# Patient Record
Sex: MALE | Race: WHITE | NOT HISPANIC OR LATINO | Employment: UNEMPLOYED | URBAN - METROPOLITAN AREA
[De-identification: names, ages, dates, MRNs, and addresses within clinical notes are randomized per-mention and may not be internally consistent; named-entity substitution may affect disease eponyms.]

---

## 2022-01-03 ENCOUNTER — HOSPITAL ENCOUNTER (EMERGENCY)
Facility: HOSPITAL | Age: 27
Discharge: HOME | End: 2022-01-03
Attending: EMERGENCY MEDICINE | Admitting: EMERGENCY MEDICINE
Payer: COMMERCIAL

## 2022-01-03 ENCOUNTER — APPOINTMENT (EMERGENCY)
Dept: CT IMAGING | Facility: HOSPITAL | Age: 27
End: 2022-01-03
Payer: COMMERCIAL

## 2022-01-03 ENCOUNTER — APPOINTMENT (EMERGENCY)
Dept: RADIOLOGY | Facility: HOSPITAL | Age: 27
End: 2022-01-03
Payer: COMMERCIAL

## 2022-01-03 VITALS
OXYGEN SATURATION: 98 % | RESPIRATION RATE: 17 BRPM | SYSTOLIC BLOOD PRESSURE: 143 MMHG | HEART RATE: 69 BPM | DIASTOLIC BLOOD PRESSURE: 80 MMHG | WEIGHT: 149 LBS | TEMPERATURE: 98.5 F

## 2022-01-03 DIAGNOSIS — S63.014A CLOSED DISLOCATION OF DISTAL RADIOULNAR JOINT OF RIGHT WRIST, INITIAL ENCOUNTER: ICD-10-CM

## 2022-01-03 DIAGNOSIS — S52.611A FRACTURE OF RIGHT ULNAR STYLOID: Primary | ICD-10-CM

## 2022-01-03 PROCEDURE — 73100 X-RAY EXAM OF WRIST: CPT

## 2022-01-03 PROCEDURE — 99285 EMERGENCY DEPT VISIT HI MDM: CPT | Performed by: EMERGENCY MEDICINE

## 2022-01-03 PROCEDURE — 73130 X-RAY EXAM OF HAND: CPT

## 2022-01-03 PROCEDURE — 73110 X-RAY EXAM OF WRIST: CPT

## 2022-01-03 PROCEDURE — 73200 CT UPPER EXTREMITY W/O DYE: CPT

## 2022-01-03 PROCEDURE — 99284 EMERGENCY DEPT VISIT MOD MDM: CPT

## 2022-01-03 PROCEDURE — 25605 CLTX DST RDL FX/EPHYS SEP W/: CPT | Performed by: EMERGENCY MEDICINE

## 2022-01-03 PROCEDURE — 73090 X-RAY EXAM OF FOREARM: CPT

## 2022-01-03 PROCEDURE — G1004 CDSM NDSC: HCPCS

## 2022-01-03 PROCEDURE — 96374 THER/PROPH/DIAG INJ IV PUSH: CPT

## 2022-01-03 RX ORDER — LIDOCAINE HYDROCHLORIDE 10 MG/ML
10 INJECTION, SOLUTION EPIDURAL; INFILTRATION; INTRACAUDAL; PERINEURAL ONCE
Status: COMPLETED | OUTPATIENT
Start: 2022-01-03 | End: 2022-01-03

## 2022-01-03 RX ORDER — FENTANYL CITRATE 50 UG/ML
50 INJECTION, SOLUTION INTRAMUSCULAR; INTRAVENOUS ONCE
Status: COMPLETED | OUTPATIENT
Start: 2022-01-03 | End: 2022-01-03

## 2022-01-03 RX ORDER — HYDROCODONE BITARTRATE AND ACETAMINOPHEN 5; 325 MG/1; MG/1
1 TABLET ORAL EVERY 6 HOURS PRN
Qty: 4 TABLET | Refills: 0 | Status: SHIPPED | OUTPATIENT
Start: 2022-01-03 | End: 2022-01-04

## 2022-01-03 RX ORDER — PROPOFOL 10 MG/ML
100 INJECTION, EMULSION INTRAVENOUS ONCE
Status: COMPLETED | OUTPATIENT
Start: 2022-01-03 | End: 2022-01-03

## 2022-01-03 RX ORDER — HYDROCODONE BITARTRATE AND ACETAMINOPHEN 5; 325 MG/1; MG/1
1 TABLET ORAL EVERY 6 HOURS PRN
Qty: 4 TABLET | Refills: 0 | Status: SHIPPED | OUTPATIENT
Start: 2022-01-03 | End: 2022-01-03 | Stop reason: SDUPTHER

## 2022-01-03 RX ORDER — KETAMINE HCL IN NACL, ISO-OSM 100MG/10ML
0.5 SYRINGE (ML) INJECTION ONCE
Status: COMPLETED | OUTPATIENT
Start: 2022-01-03 | End: 2022-01-03

## 2022-01-03 RX ADMIN — FENTANYL CITRATE 50 MCG: 50 INJECTION INTRAMUSCULAR; INTRAVENOUS at 18:43

## 2022-01-03 RX ADMIN — PROPOFOL 100 MG: 10 INJECTION, EMULSION INTRAVENOUS at 19:45

## 2022-01-03 RX ADMIN — Medication 34 MG: at 19:45

## 2022-01-03 RX ADMIN — LIDOCAINE HYDROCHLORIDE 10 ML: 10 INJECTION, SOLUTION EPIDURAL; INFILTRATION; INTRACAUDAL; PERINEURAL at 19:24

## 2022-01-03 NOTE — ED PROVIDER NOTES
History  Chief Complaint   Patient presents with    Wrist Injury     pt fell while snowboarding - landed on his right wrist, deformity noted to same     HPI      This is a very pleasant, nontoxic, 70-year-old male presents the emergency department status post snowboarding accident where he fell on an outstretched hand while snowboarding today at [facility]  This occurred at approximately 5:00 p m  Souleymane Alejandro Last time patient had anything to eat was at 1:00 p m  Souleymane Alejandro Patient did not hit his head  Patient is left-hand dominant  Patient went to the  and was splint by the  and advised to go to the emergency department for further evaluation  Patient is not from the area and lives in [facility]  Patient denies any chest pain, shortness of breath, nausea, vomiting, did not take any other medication for his pain prior to arrival but was icing his wrist and was in a volar splint prior to arrival   Patient denies any numbness or tingling in the R extremity, denies any shoulder pain, or shoulder pain  None       History reviewed  No pertinent past medical history  History reviewed  No pertinent surgical history  History reviewed  No pertinent family history  I have reviewed and agree with the history as documented  E-Cigarette/Vaping     E-Cigarette/Vaping Substances     Social History     Tobacco Use    Smoking status: Never Smoker    Smokeless tobacco: Never Used   Substance Use Topics    Alcohol use: Yes     Comment: rarely    Drug use: Not on file       Review of Systems   Constitutional: Negative  HENT: Negative  Eyes: Negative  Respiratory: Negative  Negative for cough  Cardiovascular: Negative  Negative for chest pain  Gastrointestinal: Negative  Negative for abdominal pain  Endocrine: Negative  Genitourinary: Negative  Musculoskeletal: Positive for joint swelling  Negative for arthralgias  Skin: Negative for wound  Allergic/Immunologic: Negative  Neurological: Negative  Hematological: Negative  Psychiatric/Behavioral: Negative  Physical Exam  Physical Exam  Vitals and nursing note reviewed  Constitutional:       Appearance: Normal appearance  He is normal weight  HENT:      Head: Normocephalic and atraumatic  Right Ear: Tympanic membrane, ear canal and external ear normal       Left Ear: Tympanic membrane, ear canal and external ear normal       Nose: Nose normal       Mouth/Throat:      Mouth: Mucous membranes are moist       Pharynx: Oropharynx is clear  Eyes:      Extraocular Movements: Extraocular movements intact  Conjunctiva/sclera: Conjunctivae normal       Pupils: Pupils are equal, round, and reactive to light  Cardiovascular:      Rate and Rhythm: Normal rate and regular rhythm  Pulses: Normal pulses  Heart sounds: Normal heart sounds  Pulmonary:      Effort: Pulmonary effort is normal       Breath sounds: Normal breath sounds  Abdominal:      General: Abdomen is flat  Bowel sounds are normal    Musculoskeletal:         General: Swelling, tenderness, deformity and signs of injury present  Hands:       Cervical back: Normal range of motion  Skin:     General: Skin is warm  Capillary Refill: Capillary refill takes less than 2 seconds  Neurological:      General: No focal deficit present  Mental Status: He is alert and oriented to person, place, and time  Mental status is at baseline  Psychiatric:         Mood and Affect: Mood normal          Behavior: Behavior normal          Thought Content:  Thought content normal          Judgment: Judgment normal          Vital Signs  ED Triage Vitals [01/03/22 1723]   Temperature Pulse Respirations Blood Pressure SpO2   98 5 °F (36 9 °C) 83 14 125/69 98 %      Temp Source Heart Rate Source Patient Position - Orthostatic VS BP Location FiO2 (%)   Oral Monitor Sitting Left arm --      Pain Score       8           Vitals:    01/03/22 2018 01/03/22 2020 01/03/22 2045 01/03/22 2115   BP:  136/73 137/76 143/80   Pulse: 80  75 69   Patient Position - Orthostatic VS:             Visual Acuity       ED Medications  Medications   fentanyl citrate (PF) 100 MCG/2ML 50 mcg (50 mcg Intravenous Given 1/3/22 1843)   lidocaine (PF) (XYLOCAINE-MPF) 1 % injection 10 mL (10 mL Infiltration Given by Other 1/3/22 1924)   Ketamine HCl 34 mg (34 mg Intravenous Given 1/3/22 1945)   propofol (DIPRIVAN) 200 MG/20ML bolus injection 100 mg (100 mg Intravenous Given 1/3/22 1945)       Diagnostic Studies  Results Reviewed       None                   CT upper extremity wo contrast right   Final Result by Rogelio Isidro MD (01/03 2227)      Comminuted fracture involving the radial styloid as well as the anterior and posterior aspects of the distal radius  [place] Po Ulnar styloid fracture  Workstation performed: [id number]         XR wrist 2 vw right   ED Interpretation by Sabrina Gracia III, DO (01/03 2106)      XR wrist 2 vw right   ED Interpretation by Sabrina Gracia III, DO (01/03 2040)      XR forearm 2 views RIGHT   ED Interpretation by Sabrina Gracia III, DO (01/03 1803)   No fracture of forearm, radius and ulna intact, see wrist report  Final Result by Michael An MD (01/03 1831)   Radial styloid and ulnar styloid fractures with posterior carpal dislocation  Workstation performed: [id number]         XR wrist 3+ views RIGHT   ED Interpretation by Sabrina Gracia III, DO (01/03 1843)   Radiocarpal fracture dislocation with ulnar styloid fracture  The study was marked in [facility] for immediate notification  Workstation performed: [id number]         Final Result by Michael An MD (01/03 1832)   Radiocarpal fracture dislocation with ulnar styloid fracture  The study was marked in [facility] for immediate notification        Workstation performed: [id number]         XR hand 3+ views RIGHT   Final Result by Michael An MD (72/83 1832) Radiocarpal fracture dislocation as above  Ulnar styloid fracture also noted  Workstation performed: [id number]                    Procedures  Orthopedic injury treatment    Date/Time: 1/3/2022 7:17 PM  Performed by: Nirmala Moyer DO  Authorized by: Nirmala Moyer DO     Patient Location:  ED  Other Assisting Provider: No    Verbal consent obtained?: Yes    Risks and benefits: Risks, benefits and alternatives were discussed    Consent given by:  Patient  Patient identity confirmed:  Verbally with patient  Time out: Immediately prior to the procedure a time out was called    Injury location:  Wrist  Location details:  Left wrist  Injury type:  Fracture-dislocation  Neurovascular status: Neurovascularly intact    Distal perfusion: normal    Neurological function: normal    Range of motion: normal    Local anesthesia used?: Yes    Anesthesia:  Local infiltration and hematoma block  Sedation type:   Anxiolysis and moderate (conscious) sedation (See separate Procedural Sedation form)  Manipulation performed?: Yes    Skin traction used?: Yes    Skeletal traction used?: Yes    Reduction successful?: No    Immobilization:  Splint  Splint type:  Sugar tong  Supplies used:  Cotton padding, elastic bandage and Ortho-Glass  Neurovascular status: Neurovascularly intact    Distal perfusion: normal    Neurological function: normal    Range of motion: normal    Patient tolerance:  Patient tolerated the procedure well with no immediate complications  Orthopedic injury treatment    Date/Time: 1/3/2022 8:40 PM  Performed by: Nirmala Moyer DO  Authorized by: Nirmala Moyer DO     Patient Location:  ED  Other Assisting Provider: Yes (comment)    Patient identity confirmed:  Verbally with patient, arm band and provided demographic data  Injury location:  Wrist  Location details:  Left wrist  Injury type:  Fracture-dislocation  Neurovascular status: Neurovascularly intact Distal perfusion: normal    Neurological function: normal    Range of motion: normal    Local anesthesia used?: Yes    General anesthesia used?: Yes    Anesthesia:  Hematoma block  Manipulation performed?: Yes    Skin traction used?: Yes    Skeletal traction used?: Yes    Reduction successful?: Yes    Confirmation: Reduction confirmed by x-ray (Case reviewed via tiger text with Dr Dixon Zhoa  )    Immobilization:  Splint, ace wrap and sling  Splint type:  Sugar tong  Neurovascular status: Neurovascularly intact    Distal perfusion: normal    Neurological function: normal    Range of motion: normal    Patient tolerance:  Patient tolerated the procedure well with no immediate complications  Procedural Sedation    Date/Time: 1/3/2022 7:45 PM  Performed by: Friddie Closs, DO  Authorized by: Tej Suarez III, DO     Immediate pre-procedure details:     Reassessment: Patient reassessed immediately prior to procedure    Procedure details (see MAR for exact dosages):     Sedation start time:  1/3/2022 7:45 PM    Preoxygenation:  Nasal cannula    Analgesia:  Fentanyl    Intra-procedure monitoring:  Blood pressure monitoring, cardiac monitor, continuous capnometry, continuous pulse oximetry and frequent LOC assessments    Intra-procedure events: none      Sedation end time:  1/3/2022 8:00 PM    Total sedation time (minutes):  15  Post-procedure details:     Post-sedation assessment completed:  1/3/2022 9:33 PM    Attendance: Constant attendance by certified staff until patient recovered      Recovery: Patient returned to pre-procedure baseline      Post-sedation assessments completed and reviewed: airway patency, cardiovascular function, hydration status, mental status, nausea/vomiting, pain level, respiratory function and temperature      Patient is stable for discharge or admission: yes      Patient tolerance:   Tolerated well, no immediate complications      Conscious Sedation Assessment Classification Score   ASA Scale Assessment 1-Healthy patient, no disease outside surgical process filed at 01/03/2022 1942               ED Course  ED Course as of 01/08/22 1154   Mon Jan 03, 2022 1824 History and physical completed  X-rays reviewed, images sent to Dr Renetta Cosme  [facility] with Dr Renetta Cosme, orthopedics on call, noted since it is in the wrist and hand he does not perform procedures in this area  1917 Hematoma block performed  1928 Patient placed in finger traps  2007 Reduction completed  2018 Case discussed with hand surgery on call Dr Marisol Briseno, reviewed imaging via tiger text, past if the reduction be tried again and for more optimal realignment  2040 Reduction attempted again after discussion with ortho   2052 Review of the x-rays noted with the 2nd post reduction film better alignment  2107 Carter d/w Dr Boudreaux, noted second reduction is improved alignment  MDM  Number of Diagnoses or Management Options  Closed dislocation of distal radioulnar joint of right wrist, initial encounter  Fracture of right ulnar styloid  Diagnosis management comments:  [address] [place] [address] Rd mechanism of right hand, patient is a left-hand dominant 78-year-old male presents the emergency department with left wrist radial ulnar dislocation with the distal ulnar styloid fracture, 2 attempts at reduction occurred, see reduction note for specifics and discussion with on-call radiology and hand surgery  STAT ambulatory referral made to hand surgery, pt lives outside the area: [facility], but is willing to drive for follow up, CT imaging of wrist obtained prior to discharge  Review of the patient'demographics in the [place] department of Health for compartment program noted no red flags  Portions of the record may have been created with voice recognition software   Occasional wrong word or "sound a like" substitutions may have occurred due to the inherent limitations of voice recognition software  Read the chart carefully and recognize, using context, where substitutions have occurred  Counseling: I had a detailed discussion with the patient and/or guardian regarding: the historical points, exam findings, and any diagnostic results supporting the discharge diagnosis, lab results, radiology results, discharge instructions reviewed with patient and/or family/caregiver and understanding was verbalized  Instructions given to return to the emergency department if symptoms worsen or persist, or if there are any questions or concerns that arise at home  **PLEASE NOTE THAT A ERROR HAS OCCURRED IN DOCUMENTATION: A RIGHT WRIST REDUCTION OCCURRED X 2, not a L wrist wrist reduction  I attempted to modify my note but Epic would allow changes to my note  **  (01/08/22: 11:53 am)       Amount and/or Complexity of Data Reviewed  Clinical lab tests: ordered and reviewed  Tests in the radiology section of CPT®: ordered and reviewed  Tests in the medicine section of CPT®: ordered and reviewed        Disposition  Final diagnoses:   Fracture of right ulnar styloid   Closed dislocation of distal radioulnar joint of right wrist, initial encounter     Time reflects when diagnosis was documented in both MDM as applicable and the Disposition within this note       Time User Action Codes Description Comment    1/3/2022  9:12 PM Zhanna Kyle, [id number] [address] [R19 530C] Closed dislocation of left wrist     1/3/2022  9:12 PM Moise Frausto Add [T55 328P] Fracture of right ulnar styloid     1/3/2022  9:13 PM Anirudh 68 [S85 698T] Fracture of right ulnar styloid     1/3/2022  9:13 PM Moise Frausto Remove [S63 005A] Closed dislocation of left wrist     1/3/2022  9:16 PM Moise Frausto Add [S63 014A] Closed dislocation of distal radioulnar joint of right wrist, initial encounter           ED Disposition       ED Disposition Condition Date/Time Comment    Discharge Stable Mon Andrew 3, 2022  9:12 PM Israel Aguirre discharge to home/self care                  Follow-up Information       Follow up With Specialties Details Why [facility],  Family Medicine   [address]  [address]      Serena Robb MD Orthopedic Surgery   [address] [id number] [phone]               Patient's Medications   Discharge Prescriptions    HYDROCODONE-ACETAMINOPHEN (NORCO) 5-325 MG PER TABLET    Take 1 tablet by mouth every 6 (six) hours as needed for pain for up to 1 day Max Daily Amount: 4 tablets       Start Date: 1/3/2022  End Date: 1/4/2022       Order Dose: 1 tablet       Quantity: 4 tablet    Refills: 0           PDMP Review         Value Time User    PDMP Reviewed  Yes 1/3/2022  9:37 PM Margie Rees DO            ED Provider  Electronically Signed by           Ramsey Ahumada III, DO  01/03/22 [place] Adrienne PRAKASH,   01/08/22 [id number] [address] OLINDA, DO  01/08/22 1154

## 2022-01-04 VITALS
HEIGHT: 68 IN | WEIGHT: 145 LBS | BODY MASS INDEX: 21.98 KG/M2 | SYSTOLIC BLOOD PRESSURE: 119 MMHG | DIASTOLIC BLOOD PRESSURE: 70 MMHG | HEART RATE: 73 BPM

## 2022-01-04 DIAGNOSIS — S52.611A FRACTURE OF RIGHT ULNAR STYLOID: ICD-10-CM

## 2022-01-04 DIAGNOSIS — S63.014A CLOSED DISLOCATION OF DISTAL RADIOULNAR JOINT OF RIGHT WRIST, INITIAL ENCOUNTER: ICD-10-CM

## 2022-01-04 DIAGNOSIS — S62.121K: Primary | ICD-10-CM

## 2022-01-04 PROCEDURE — 99202 OFFICE O/P NEW SF 15 MIN: CPT | Performed by: ORTHOPAEDIC SURGERY

## 2022-01-04 RX ORDER — IBUPROFEN 200 MG
TABLET ORAL EVERY 6 HOURS PRN
COMMUNITY

## 2022-01-04 NOTE — TELEPHONE ENCOUNTER
Spoke to mom and patient and information above provided  They will call back if above remedy does not help

## 2022-01-04 NOTE — TELEPHONE ENCOUNTER
Patient is concerned that the splint will cause nerve damage as he feels his fingers are being pressed into splint  Instructed patient to check cap refill and his cap refill is within normal limits  His fingers are numb most likely from swelling, advised ice 20 on 20 off with elevation above the heart  Call with worsening of symptoms or report to the ER  Patient is not being seen until 1/10 by Dr Aracelis Houser     Please advise

## 2022-01-04 NOTE — TELEPHONE ENCOUNTER
Patient saw Dr Matt Miguel  Patients mother is calling concerned because the splint may be too tight and his fingers are tingling        Transferred to triage nurse

## 2022-01-04 NOTE — TELEPHONE ENCOUNTER
Left message to inform to arrive for 1:45am for a 2pm appt with Dr aDrrian Powers at the [facility] location on 1/10/21

## 2022-01-04 NOTE — PROGRESS NOTES
Assessment/Plan:    No problem-specific Assessment & Plan notes found for this encounter  Diagnoses and all orders for this visit:    Closed dorsal perilunate fracture dislocation of right wrist with nonunion    Other orders  -     ibuprofen (MOTRIN) 200 mg tablet; Take by mouth every 6 (six) hours as needed for mild pain AS NEEDED          Subjective:      Patient ID: Kendy Orr is a 23 y o  male  This is a 25-year-old fellow who was snowboarding and crashed into the [place] sustaining a fracture of his right wrist   He was brought to the hospital with a did a closed reduction and was able to reduce his dislocation  He now follows up  The following portions of the patient's history were reviewed and updated as appropriate: allergies, current medications, past family history, past medical history, past social history, past surgical history and problem list     Review of Systems   Constitutional: Negative  Negative for activity change, diaphoresis and fatigue  Cardiovascular: Negative for leg swelling  Musculoskeletal: Negative for arthralgias, gait problem and joint swelling  Skin: Negative for color change and wound  Neurological: Negative for numbness  Objective:      /70   Pulse 73   Ht 5' 8" (1 727 m)   Wt 65 8 kg (145 lb)   BMI 22 05 kg/m²          Physical Exam      On physical examination he is currently in the splint  He has good sensation and capillary refill distally  His alignment looks good  There is no sign of subluxation or dislocation

## 2022-01-04 NOTE — ASSESSMENT & PLAN NOTE
I am going to do S this case with Dr Tyrese Graves  I think he will probably need an open reduction internal fixation to stabilize is very complex injury  He has already had a CT scan on the chart  We will have him have a default appointment at the [facility] on Monday if he has not contacted before then

## 2022-01-04 NOTE — TELEPHONE ENCOUNTER
Hello,    Please advise if the following patient can be forced onto the scheduled:    Andres Plummer   [birth year]  MRN [id number]  # [phone]  Requesting Dr Michaelle Mckinley  Right wrist fracture, DOI 1/3, was advised by Ed to be seen today    Email sent to [facility]

## 2022-01-04 NOTE — TELEPHONE ENCOUNTER
I discussed the case with Dr Lara Bello  In addition to icing the arm, the patient may loosen the ace wraps around the splint  If he continues to have symptoms after these measures, he can call again  Would you like me to call the patient and inform him? Thanks very much

## 2022-01-10 ENCOUNTER — APPOINTMENT (OUTPATIENT)
Dept: RADIOLOGY | Facility: CLINIC | Age: 27
End: 2022-01-10
Payer: COMMERCIAL

## 2022-01-10 VITALS
WEIGHT: 145 LBS | DIASTOLIC BLOOD PRESSURE: 80 MMHG | BODY MASS INDEX: 21.98 KG/M2 | SYSTOLIC BLOOD PRESSURE: 123 MMHG | HEIGHT: 68 IN

## 2022-01-10 DIAGNOSIS — S63.014A CLOSED DISLOCATION OF DISTAL RADIOULNAR JOINT OF RIGHT WRIST, INITIAL ENCOUNTER: ICD-10-CM

## 2022-01-10 DIAGNOSIS — S63.094A CLOSED DISLOCATION OF PERILUNATE JOINT OF RIGHT WRIST, INITIAL ENCOUNTER: Primary | ICD-10-CM

## 2022-01-10 PROCEDURE — 29075 APPL CST ELBW FNGR SHORT ARM: CPT | Performed by: ORTHOPAEDIC SURGERY

## 2022-01-10 PROCEDURE — 99214 OFFICE O/P EST MOD 30 MIN: CPT | Performed by: ORTHOPAEDIC SURGERY

## 2022-01-10 PROCEDURE — 73110 X-RAY EXAM OF WRIST: CPT

## 2022-01-10 NOTE — PROGRESS NOTES
ASSESSMENT/PLAN:    Assessment:   Right wrist radial styloid fracture/dislocation    Plan:   Conservative versus operative treatments were discussed with the patient and his mother today at length  Due to maintained alignment after his reduction on x-ray today  It was discussed with the patient and his mother that it would be reasonable to place the patient into a short-arm cast and continue to monitor his fracture with weekly x-rays  It was discussed that if there is any movement across the wrist over the next week or two that surgical intervention consisting of open reduction internal fixation right wrist dislocation (suture anchors, tri-Manpacks wrist fx set) would be advised  Patient was also given the option of surgical intervention this week  Patient would like to do a trial of conservative treatment options at this time  We will see him back next week for repeat x-rays  It was discussed with the patient his mother today that this wrist will never be 100% normal due to his significant injury sustained  Follow Up:  1  week(s)    To Do Next Visit:    and X-rays of the  right  wrist    General Discussions:     Fracture - Nonoperative Care: The physiology of a fractured bone was discussed with the patient today  With nondisplaced or minimally displaced fractures, conservative treatment often results in a functional recovery  Typically, these fractures are immobilized in either a cast or splint depending on the pattern  Radiographs are typically taken at intervals throughout the fracture healing to ensure that muscular forces do not cause loss of reduction or alignment  If the fracture loses its alignment, surgical intervention may be required to stabilize it  Medical conditions such as diabetes, osteoporosis, vitamin D deficiency, and a history of or exposure to smoking may delay or prevent fracture healing      Operative Discussions:       _____________________________________________________  CHIEF COMPLAINT:  Chief Complaint   Patient presents with    Right Wrist - Fracture     XR 1/3/2022         SUBJECTIVE:  Shayy Myers is a 23 y o  male who presents with Fracture to the right wrist   This started  1 week(s) ago as Due to a personal injury  Patient states that he fell when he was snowboarding  Radiation: Yes to the  wrist  Previous Treatments: reduction and splinting with only partial relief  Associated symptoms: No Complaints  Handedness: left  Work status: not working     PAST MEDICAL HISTORY:  History reviewed  No pertinent past medical history  PAST SURGICAL HISTORY:  History reviewed  No pertinent surgical history  FAMILY HISTORY:  History reviewed  No pertinent family history  SOCIAL HISTORY:  Social History     Tobacco Use    Smoking status: Never Smoker    Smokeless tobacco: Never Used   Substance Use Topics    Alcohol use: Yes     Comment: rarely    Drug use: Not on file       MEDICATIONS:    Current Outpatient Medications:     ibuprofen (MOTRIN) 200 mg tablet, Take by mouth every 6 (six) hours as needed for mild pain AS NEEDED, Disp: , Rfl:     ALLERGIES:  No Known Allergies    REVIEW OF SYSTEMS:  Pertinent items are noted in HPI      LABS:  HgA1c: No results found for: HGBA1C  BMP: No results found for: GLUCOSE, CALCIUM, NA, K, CO2, CL, BUN, CREATININE      _____________________________________________________  PHYSICAL EXAMINATION:  Vital signs: /80   Ht 5' 8" (1 727 m)   Wt 65 8 kg (145 lb)   BMI 22 05 kg/m²   General: well developed and well nourished, alert, oriented times 3 and appears comfortable  Psychiatric: Normal  HEENT: Trachea Midline, No torticollis  Cardiovascular: No discernable arrhythmia  Pulmonary: No wheezing or stridor  Abdomen: No rebound or guarding  Extremities: No peripheral edema  Skin: No masses, erythema, lacerations, fluctation, ulcerations  Neurovascular: Sensation Intact to the Median, Ulnar, Radial Nerve, Motor Intact to the Median, Ulnar, Radial Nerve and Pulses Intact    MUSCULOSKELETAL EXAMINATION:  RIGHT SIDE:  Wrist:  Patient presents in a well fitted sugar-tong splint today  Finger sensation is intact  Neurovascularly intact    _____________________________________________________  STUDIES REVIEWED:  Images were reviewed in PACS by Dr Jenaro Quesada and demonstrate:  X-ray of right wrist on 01/10/2022 demonstrates maintained alignment status post wrist fracture dislocation        PROCEDURES PERFORMED:  Fracture / Dislocation Treatment    Date/Time: 1/10/2022 3:10 PM  Performed by: Trang Louie MD  Authorized by: Trang Louie MD     Patient Location:  Clinic  Verbal consent obtained?: Yes    Risks and benefits: Risks, benefits and alternatives were discussed    Consent given by:  Patient  Site marked: Yes    Injury location:  Wrist  Location details:  Right wrist  Injury type:  Fracture-dislocation  Fracture-dislocation type: Stewart's    Fracture-dislocation type: Stewart's    Neurovascular status: Neurovascularly intact    Manipulation performed?: No    Immobilization:  Cast  Cast type:  Short arm  Supplies used:  Cotton padding and fiberglass  Neurovascular status: Neurovascularly intact    Patient tolerance:  Patient tolerated the procedure well with no immediate complications          Scribe Attestation    I,:  Delmar Galaviz am acting as a scribe while in the presence of the attending physician :       I,:  Trang Louie MD personally performed the services described in this documentation    as scribed in my presence :

## 2022-01-19 ENCOUNTER — HOSPITAL ENCOUNTER (OUTPATIENT)
Dept: RADIOLOGY | Facility: HOSPITAL | Age: 27
Discharge: HOME | End: 2022-01-19
Attending: ORTHOPAEDIC SURGERY
Payer: COMMERCIAL

## 2022-01-19 VITALS
DIASTOLIC BLOOD PRESSURE: 73 MMHG | HEIGHT: 68 IN | WEIGHT: 145 LBS | BODY MASS INDEX: 21.98 KG/M2 | HEART RATE: 81 BPM | SYSTOLIC BLOOD PRESSURE: 118 MMHG

## 2022-01-19 DIAGNOSIS — S63.094A CLOSED DISLOCATION OF PERILUNATE JOINT OF RIGHT WRIST, INITIAL ENCOUNTER: ICD-10-CM

## 2022-01-19 DIAGNOSIS — S52.514D CLOSED NONDISPLACED FRACTURE OF STYLOID PROCESS OF RIGHT RADIUS WITH ROUTINE HEALING, SUBSEQUENT ENCOUNTER: Primary | ICD-10-CM

## 2022-01-19 PROCEDURE — 99213 OFFICE O/P EST LOW 20 MIN: CPT | Performed by: ORTHOPAEDIC SURGERY

## 2022-01-19 PROCEDURE — 73110 X-RAY EXAM OF WRIST: CPT

## 2022-01-19 NOTE — PROGRESS NOTES
ASSESSMENT/PLAN:    Assessment:   Fracture right radial styloid - stable    Plan:   Continue NWB to the right wrist in cast  - will continue to treat non-operatively as fracture is currently stable  - will repeat imaging in 1 week and will likely complete 6 total weeks in a cast    Follow Up:  1  week(s)    To Do Next Visit:  X-rays of the  right  wrist    General Discussions:  Fracture - Nonoperative Care: The physiology of a fractured bone was discussed with the patient today  With nondisplaced or minimally displaced fractures, conservative treatment often results in a functional recovery  Typically, these fractures are immobilized in either a cast or splint depending on the pattern  Radiographs are typically taken at intervals throughout the fracture healing to ensure that muscular forces do not cause loss of reduction or alignment  If the fracture loses its alignment, surgical intervention may be required to stabilize it  Medical conditions such as diabetes, osteoporosis, vitamin D deficiency, and a history of or exposure to smoking may delay or prevent fracture healing     _____________________________________________________  CHIEF COMPLAINT:  Chief Complaint   Patient presents with    Right Wrist - Fracture, Follow-up         SUBJECTIVE:  Shayy Myers is a 23 y o  male who presents for follow up regarding right radial styloid fracture from an injury sustained on 1/3/2022  Patient has been treated non-operatively in a cast  He denies any issues today, no increase in pain  PAST MEDICAL HISTORY:  History reviewed  No pertinent past medical history  PAST SURGICAL HISTORY:  History reviewed  No pertinent surgical history  FAMILY HISTORY:  History reviewed  No pertinent family history      SOCIAL HISTORY:  Social History     Tobacco Use    Smoking status: Never Smoker    Smokeless tobacco: Never Used   Substance Use Topics    Alcohol use: Yes     Comment: rarely    Drug use: Not on file MEDICATIONS:    Current Outpatient Medications:     ibuprofen (MOTRIN) 200 mg tablet, Take by mouth every 6 (six) hours as needed for mild pain AS NEEDED, Disp: , Rfl:     ALLERGIES:  No Known Allergies    REVIEW OF SYSTEMS:  Pertinent items are noted in HPI  A comprehensive review of systems was negative      LABS:  HgA1c: No results found for: HGBA1C  BMP: No results found for: GLUCOSE, CALCIUM, NA, K, CO2, CL, BUN, CREATININE        _____________________________________________________  PHYSICAL EXAMINATION:  Vital signs: /73   Pulse 81   Ht 5' 8" (1 727 m)   Wt 65 8 kg (145 lb)   BMI 22 05 kg/m²   General: well developed and well nourished, alert, oriented times 3 and appears comfortable  Psychiatric: Normal  HEENT: Trachea Midline, No torticollis  Cardiovascular: No discernable arrhythmia  Pulmonary: No wheezing or stridor  Abdomen: No rebound or guarding  Extremities: No peripheral edema  Skin: No masses, erythema, lacerations, fluctation, ulcerations  Neurovascular: Sensation Intact to the Median, Ulnar, Radial Nerve, Motor Intact to the Median, Ulnar, Radial Nerve and Pulses Intact    MUSCULOSKELETAL EXAMINATION:  Right Wrist - examined in cast  Able to make a full fist  Sensation intact  Brisk capillary refill    _____________________________________________________  STUDIES REVIEWED:  Images were reviewed in PACS by Dr Huang Ibarra and demonstrate: stable alignment of radial styloid fractue      PROCEDURES PERFORMED:  Procedures  No Procedures performed today    Scribe Attestation    I,:  Huma Hunter PA-C am acting as a scribe while in the presence of the attending physician :       I,:  Leopold Mitts, MD personally performed the services described in this documentation    as scribed in my presence :           Scribe Attestation    I,:  Huma Hunter PA-C am acting as a scribe while in the presence of the attending physician :       I,:  Leopold Mitts, MD personally performed the services described in this documentation    as scribed in my presence :

## 2022-01-27 NOTE — PROGRESS NOTES
ASSESSMENT/PLAN:    Assessment:   Fracture right radial styloid - stable, currently being treated non-opeatively    Plan:   Continue non-operative management in a cast for an additional 3 weeks    Follow Up:  3  week(s)    To Do Next Visit:  X-rays of the  right  wrist and Cast/splint off prior to x-ray      _____________________________________________________  CHIEF COMPLAINT:  No chief complaint on file  SUBJECTIVE:  Andres Plummer is a 23 y o  male who presents for follow up regarding right wrist fracture from an injury sustained on 1/3/2022  Patient is currently being treated non-operatively in a cast  He denies any numbness or tingling  He denies any significant pain  He denies any other acute complaints  Handedness: left  Work status: not currently working    PAST MEDICAL HISTORY:  No past medical history on file  PAST SURGICAL HISTORY:  No past surgical history on file  FAMILY HISTORY:  No family history on file  SOCIAL HISTORY:  Social History     Tobacco Use    Smoking status: Never Smoker    Smokeless tobacco: Never Used   Substance Use Topics    Alcohol use: Yes     Comment: rarely    Drug use: Not on file       MEDICATIONS:    Current Outpatient Medications:     ibuprofen (MOTRIN) 200 mg tablet, Take by mouth every 6 (six) hours as needed for mild pain AS NEEDED, Disp: , Rfl:     ALLERGIES:  No Known Allergies    REVIEW OF SYSTEMS:  Pertinent items are noted in HPI  A comprehensive review of systems was negative  LABS:  HgA1c: No results found for: HGBA1C  BMP: No results found for: GLUCOSE, CALCIUM, NA, K, CO2, CL, BUN, CREATININE        _____________________________________________________  PHYSICAL EXAMINATION:  Vital signs: There were no vitals taken for this visit    General: well developed and well nourished, alert, oriented times 3 and appears comfortable  Psychiatric: Normal  HEENT: Trachea Midline, No torticollis  Cardiovascular: No discernable arrhythmia  Pulmonary: No wheezing or stridor  Abdomen: No rebound or guarding  Extremities: No peripheral edema  Skin: No masses, erythema, lacerations, fluctation, ulcerations  Neurovascular: Sensation Intact to the Median, Ulnar, Radial Nerve, Motor Intact to the Median, Ulnar, Radial Nerve and Pulses Intact    MUSCULOSKELETAL EXAMINATION:  Right Wrist in cast   Able to move all fingers   Sensation intact to fingers  Brisk capillary refill    _____________________________________________________  STUDIES REVIEWED:  Images were reviewed in PACS by Dr Kai Canas and demonstrate: stable appearance of radial styloid fracture without shift         PROCEDURES PERFORMED:  Procedures  No Procedures performed today

## 2022-01-28 ENCOUNTER — HOSPITAL ENCOUNTER (OUTPATIENT)
Dept: RADIOLOGY | Facility: HOSPITAL | Age: 27
Discharge: HOME | End: 2022-01-28
Payer: COMMERCIAL

## 2022-01-28 VITALS — HEIGHT: 68 IN | BODY MASS INDEX: 21.98 KG/M2 | WEIGHT: 145 LBS

## 2022-01-28 DIAGNOSIS — S52.514D CLOSED NONDISPLACED FRACTURE OF STYLOID PROCESS OF RIGHT RADIUS WITH ROUTINE HEALING, SUBSEQUENT ENCOUNTER: ICD-10-CM

## 2022-01-28 DIAGNOSIS — S52.514D CLOSED NONDISPLACED FRACTURE OF STYLOID PROCESS OF RIGHT RADIUS WITH ROUTINE HEALING, SUBSEQUENT ENCOUNTER: Primary | ICD-10-CM

## 2022-01-28 PROCEDURE — 99212 OFFICE O/P EST SF 10 MIN: CPT | Performed by: PHYSICIAN ASSISTANT

## 2022-01-28 PROCEDURE — 73110 X-RAY EXAM OF WRIST: CPT

## 2022-02-14 ENCOUNTER — APPOINTMENT (OUTPATIENT)
Dept: RADIOLOGY | Facility: CLINIC | Age: 27
End: 2022-02-14
Payer: COMMERCIAL

## 2022-02-14 VITALS — WEIGHT: 144 LBS | HEIGHT: 68 IN | BODY MASS INDEX: 21.82 KG/M2

## 2022-02-14 DIAGNOSIS — S52.514D CLOSED NONDISPLACED FRACTURE OF STYLOID PROCESS OF RIGHT RADIUS WITH ROUTINE HEALING, SUBSEQUENT ENCOUNTER: Primary | ICD-10-CM

## 2022-02-14 DIAGNOSIS — S52.514D CLOSED NONDISPLACED FRACTURE OF STYLOID PROCESS OF RIGHT RADIUS WITH ROUTINE HEALING, SUBSEQUENT ENCOUNTER: ICD-10-CM

## 2022-02-14 PROCEDURE — 99213 OFFICE O/P EST LOW 20 MIN: CPT | Performed by: ORTHOPAEDIC SURGERY

## 2022-02-14 PROCEDURE — 73110 X-RAY EXAM OF WRIST: CPT

## 2022-02-14 NOTE — PROGRESS NOTES
ASSESSMENT/PLAN:    Assessment:   Fracture right radial styloid - healed    Plan:   Patient can use brace for lifting activities at this time  Patient can work on range of motion activities with progression to strengthening  Follow Up:  6  week(s)    To Do Next Visit:  X-rays of the  right  wrist      _____________________________________________________  CHIEF COMPLAINT:  Chief Complaint   Patient presents with    Right Wrist - Follow-up, Fracture         SUBJECTIVE:  Kendy Orr is a 33 y o  male who presents for follow up regarding right radial styloid fracture treated non operatively in a cast   Patient has been treating this for 6 weeks  He denies any significant pain  He does have some stiffness  He denies any numbness or tingling  He denies any other acute complaints  PAST MEDICAL HISTORY:  History reviewed  No pertinent past medical history  PAST SURGICAL HISTORY:  History reviewed  No pertinent surgical history  FAMILY HISTORY:  History reviewed  No pertinent family history  SOCIAL HISTORY:  Social History     Tobacco Use    Smoking status: Never Smoker    Smokeless tobacco: Never Used   Substance Use Topics    Alcohol use: Yes     Comment: rarely    Drug use: Not on file       MEDICATIONS:    Current Outpatient Medications:     ibuprofen (MOTRIN) 200 mg tablet, Take by mouth every 6 (six) hours as needed for mild pain AS NEEDED, Disp: , Rfl:     ALLERGIES:  No Known Allergies    REVIEW OF SYSTEMS:  Pertinent items are noted in HPI  A comprehensive review of systems was negative      LABS:  HgA1c: No results found for: HGBA1C  BMP: No results found for: GLUCOSE, CALCIUM, NA, K, CO2, CL, BUN, CREATININE        _____________________________________________________  PHYSICAL EXAMINATION:  Vital signs: Ht 5' 8" (1 727 m)   Wt 65 3 kg (144 lb)   BMI 21 90 kg/m²   General: well developed and well nourished, alert, oriented times 3 and appears comfortable  Psychiatric: Normal  HEENT: Trachea Midline, No torticollis  Cardiovascular: No discernable arrhythmia  Pulmonary: No wheezing or stridor  Abdomen: No rebound or guarding  Extremities: No peripheral edema  Skin: No masses, erythema, lacerations, fluctation, ulcerations  Neurovascular: Sensation Intact to the Median, Ulnar, Radial Nerve, Motor Intact to the Median, Ulnar, Radial Nerve and Pulses Intact    MUSCULOSKELETAL EXAMINATION:  RIGHT SIDE:  Wrist:  No Tenderness and ROM wrist flexion 45 degrees, extension to 30 degrees    _____________________________________________________  STUDIES REVIEWED:  Images were reviewed in PACS by Dr Danie Arteaga and demonstrate: nondisplaced radial styloid fracture      PROCEDURES PERFORMED:  Procedures  No Procedures performed today

## 2022-02-14 NOTE — PATIENT INSTRUCTIONS
For the next two weeks at least, use the brace on your injured wrist  Use it longer than two weeks if you continue to have pain  During this time, you should remove the brace 4-5 times a day and practice exercises for your wrist  There are 5 exercises (see below)  You can take the brace off to shower and wash hands as well  Do not lift more than 10 lbs in the next two weeks  Exercises:  - Keep elbow at the side  Rotate palm up and palm down  Assist the rotation with your uninjured hand  Hold for 30 seconds at the point where you feel tightness and minimal pain  - Keep elbow on the table  Flex wrist down and back  Assist the flexion and back bending with your uninjured hand  Hold for 30 seconds at the point where you feel tightness and minimal pain  - Clench fist on the injured hand  Assist the clench with your uninjured hand  Hold for 30 seconds at the point where you feel tightness and minimal pain